# Patient Record
Sex: MALE | Race: WHITE | NOT HISPANIC OR LATINO
[De-identification: names, ages, dates, MRNs, and addresses within clinical notes are randomized per-mention and may not be internally consistent; named-entity substitution may affect disease eponyms.]

---

## 2021-02-11 PROBLEM — Z00.00 ENCOUNTER FOR PREVENTIVE HEALTH EXAMINATION: Status: ACTIVE | Noted: 2021-02-11

## 2021-02-12 ENCOUNTER — APPOINTMENT (OUTPATIENT)
Dept: DERMATOLOGY | Facility: CLINIC | Age: 31
End: 2021-02-12
Payer: COMMERCIAL

## 2021-02-12 ENCOUNTER — NON-APPOINTMENT (OUTPATIENT)
Age: 31
End: 2021-02-12

## 2021-02-12 PROCEDURE — 99204 OFFICE O/P NEW MOD 45 MIN: CPT

## 2021-02-12 PROCEDURE — 99072 ADDL SUPL MATRL&STAF TM PHE: CPT

## 2021-02-12 NOTE — ASSESSMENT
[FreeTextEntry1] : Pilonidal cyst; \par small, but inflamed, bled and drained; \par Therapeutic options and their risks and benefits; along with multiple diagnostic possibilities were discussed at length; risks and benefits of further study were discussed;\par \par Recommend eval by colorectal surgery for excision;

## 2021-02-12 NOTE — HISTORY OF PRESENT ILLNESS
[de-identified] : Pt. c/o cyst near tailbone;  noticed recently;  bled over last few days;  no Hx treatment in this area;

## 2021-02-24 ENCOUNTER — APPOINTMENT (OUTPATIENT)
Dept: COLORECTAL SURGERY | Facility: CLINIC | Age: 31
End: 2021-02-24
Payer: COMMERCIAL

## 2021-02-24 VITALS
WEIGHT: 205 LBS | DIASTOLIC BLOOD PRESSURE: 79 MMHG | HEART RATE: 76 BPM | TEMPERATURE: 97.6 F | HEIGHT: 73 IN | BODY MASS INDEX: 27.17 KG/M2 | RESPIRATION RATE: 16 BRPM | SYSTOLIC BLOOD PRESSURE: 121 MMHG

## 2021-02-24 DIAGNOSIS — L05.91 PILONIDAL CYST W/OUT ABSCESS: ICD-10-CM

## 2021-02-24 DIAGNOSIS — Z78.9 OTHER SPECIFIED HEALTH STATUS: ICD-10-CM

## 2021-02-24 PROCEDURE — 99072 ADDL SUPL MATRL&STAF TM PHE: CPT

## 2021-02-24 PROCEDURE — 99203 OFFICE O/P NEW LOW 30 MIN: CPT

## 2021-02-24 NOTE — ASSESSMENT
[FreeTextEntry1] : Javier presents to the office for consultation regarding an initial episode of symptomatic pilonidal cyst without abscess.  We discussed the pathophysiology of pilonidal disease and the controversy surrounding its etiology as well as best management practices.  Given that this is his first episode, he does not necessarily need to pursue elective surgery at this point.  He can certainly wait and see if further episodes develop, and/or wait for progressive disease before deciding to proceed with surgery.  As he states he is uncomfortable with sitting as well as lying down, he is inclined to opt for surgical intervention.  We discussed the option of the GIPS procedure to allow for minimal tissue resection with maximal chances for healing while also allowing for additional procedures to be performed for recalcitrant pilonidal disease.  I discussed the preparation needed for surgery, the duration of the procedure as well as postprocedure pain and wound care to be anticipated.  Given that he is a  for  post, he is currently at the beginning of his season which will end at the end of March and is therefore, an inopportune time to undergo surgery with recovery..  I have advised him to keep us posted with regards to his decision on proceeding with scheduling, and to give us at least 1 months notice in advance.  He understands, and is agreeable.

## 2021-02-24 NOTE — PHYSICAL EXAM
[de-identified] : to left of rudy cleft, two areas of prior pilonidal disease, and one pit identified along rudy cleft

## 2021-02-24 NOTE — HISTORY OF PRESENT ILLNESS
[FreeTextEntry1] : Javier presents to the office for consultation regarding a pilonidal cyst.  He reports that onset of pilonidal symptoms occurred several weeks ago with associated swelling, induration and serosanguineous drainage but no obvious purulence or fevers/chills.  This is his first episode with pilonidal disease.  He notes some discomfort with sitting as well as laying down.  No known family history with pilonidal disease.